# Patient Record
Sex: MALE | Race: WHITE | URBAN - METROPOLITAN AREA
[De-identification: names, ages, dates, MRNs, and addresses within clinical notes are randomized per-mention and may not be internally consistent; named-entity substitution may affect disease eponyms.]

---

## 2017-10-10 ENCOUNTER — HISTORICAL (OUTPATIENT)
Dept: ADMINISTRATIVE | Facility: HOSPITAL | Age: 67
End: 2017-10-10

## 2017-11-09 ENCOUNTER — HISTORICAL (OUTPATIENT)
Dept: ADMINISTRATIVE | Facility: HOSPITAL | Age: 67
End: 2017-11-09

## 2020-01-13 ENCOUNTER — HISTORICAL (OUTPATIENT)
Dept: ADMINISTRATIVE | Facility: HOSPITAL | Age: 70
End: 2020-01-13

## 2020-03-02 ENCOUNTER — HISTORICAL (OUTPATIENT)
Dept: ADMINISTRATIVE | Facility: HOSPITAL | Age: 70
End: 2020-03-02

## 2022-04-30 NOTE — OP NOTE
Patient:   Burt Krueger            MRN: [id number]            FIN: [id number]               Age:   66 years     Sex:  Male     :  1950   Associated Diagnoses:   None   Author:   Jaylen Prieto MD       Phacoemulsification of Cataract with Intraocular Implant   Preoperative Diagnosis: Cataract right eye   Postoperative Diagnosis : Cataract right eye  Surgeon: Jaylen Prieto MD  Assistant: MALENA Vilchis  Anestheisa: Topical  Complications: None  After the patient underwent topical anesthesia along with IV sedation in the holding area, the patient was brought to the operating suite. The patient was prepped and draped in a sterile fashion. A pediatric tegaderm and a lid speculum were used to retract the upper and lower lashes and lids.  A 1.0mm paracentesis was then made at the 11 oclock position. Intraocular non-preserved 1% Xylocaine (4% diluted down to 1%) was irrigated into the anterior chamber. Trypan blue dye was used to stain the anterior capsule then Endocoat was injected into the eye. A clear corneal incision was made with a 2.4 Keratome blade. BSS was used to hydro dissect the nucleus from the capsule. The nucleus was then phacoemulsified with the Abbott machine for a EFX of 39. The cortex was then removed with the I/A hand piece and Helon was placed into the posterior bag of the eye. An posterior chamber implant ZCB00 of power 19.5 was placed in the capsular bag. The Helon was then removed from the eye with the I/A hand piece . The anterior chamber was inflated with BSS and the wound was checked for leaks. The lid speculum was removed and a drop of Besivance was placed in the operative eye. The patient was brought to the recovery suite in stable condition.         10/10/2017 @ [facility]

## 2022-04-30 NOTE — OP NOTE
DATE OF SURGERY:        SURGEON:  Robson Mahoney MD    PREOPERATIVE DIAGNOSIS:  Epiretinal membrane with macular pucker to the right eye.    POSTOPERATIVE DIAGNOSIS:  Epiretinal membrane with macular pucker to the right eye.    PROCEDURES:  Pars plana vitrectomy with epiretinal membrane stripping and fluid-air exchange to the right eye.    ANESTHESIA:  General.    ESTIMATED BLOOD LOSS:  Less than 5 cc.    COMPLICATIONS:  None.    PROCEDURE INDICATIONS:  Mr. Krueger has a history of an epiretinal membrane causing macular pucker to the right eye.    DESCRIPTION OF PROCEDURE:  Patient was taken to the operative theater, where general anesthesia was begun.  The right eye was prepped and draped in the normal sterile fashion and a lid speculum applied.  A standard 3-port, 25-gauge pars plana vitrectomy was performed, with all trocars being placed 3.5 mm from the surgical limbus.  A core vitrectomy was performed.  The posterior hyaloid was moved out to the peripheral retina.  1 mg of Kenalog was injected into the vitreous cavity to highlight the epiretinal membrane, which was peeled from the macular surface using an ILM forceps without complication.  The peripheral retina was examined with scleral depression, and no retinal breaks were identified.  A fluid-air exchange was then performed.  All instruments were removed from the eye, and all sclerotomies massaged closed with cotton-tip swabs.  Several drops of TobraDex ophthalmic solution were applied to the eye.  The postoperative intraocular pressure was 15 mmHg.  The lid speculum and eye drape were then removed, and the eye was covered with a gauze patch and a Mcclendon shield.  The patient was then transported to the postoperative care unit to recover.    DISCHARGE CONDITION:  Good.      DISPOSITION:  Home, with followup with Dr. Mahoney the following day.  This patient tolerated the procedure well.        ______________________________  Robson Mahoney MD    RIB/UB  DD:   01/13/2020  Time:  01:11PM  DT:  01/13/2020  Time:  01:35PM  Job #:  [id number]

## 2022-04-30 NOTE — OP NOTE
Patient:   Burt Krueger            MRN: [id number]            FIN: [id number]               Age:   66 years     Sex:  Male     :  1950   Associated Diagnoses:   None   Author:   Jaylen Prieto MD       Phacoemulsification of Cataract with Intraocular Implant   Preoperative Diagnosis: Cataract left eye   Postoperative Diagnosis : Cataract left eye  Surgeon: Jaylen Prieto MD  Assistant: MALENA Vilchis  Anestheisa: Topical  Complications: None    After the patient underwent topical anesthesia along with IV sedation in the holding area, the patient was brought to the [facility] laser.  A time out was performed.  The capsulotomy, lens fragmentation and LRI's at 117 & 297 degrees with a length of 23 degrees were made.  Then the patient was brought to the operating suite. The patient prepped and draped in a sterile fashion. A pediatric tegaderm and a lid speculum were used to retract the upper and lower lashes and lids.  A 1.0mm paracentesis was then made at the 5 oclock and 11 oclock position. Intraocular non-preserved 1% Xylocaine (4% diluted down to 1%) was irrigated into the anterior chamber. Endocoat was injected into the eye. A clear corneal incision was made with a 2.4 Keratome blade. BSS was used to hydro dissect the nucleus from the capsule. The nucleus was then phacoemulsified with the Abbott machine for a EFX of 84. The cortex was then removed with the I/A hand piece and Helon was placed into the posterior bag of the eye. An posterior chamber implant ZCB00 of power 21.0 was placed in the capsular bag. The Helon was then removed from the eye with the I/A hand piece . The anterior chamber was inflated with BSS and the wound was checked for leaks. The lid speculum was removed and a drop of Besivance was placed in the operative eye. The patient was brought to the recovery suite in stable condition.         2017 @ [facility]

## 2022-04-30 NOTE — OP NOTE
DATE OF SURGERY:        SURGEON:  Robson Mahoney MD    PREOPERATIVE DIAGNOSIS:  Macula-off retinal detachment of the left eye with superotemporal horseshoe tear and inferotemporal proliferative vitreoretinopathy.    POSTOPERATIVE DIAGNOSIS:  Macula-off retinal detachment of the left eye with superotemporal horseshoe tear and inferotemporal proliferative vitreoretinopathy.    PROCEDURE:  Complex retinal detachment repair with pars plana vitrectomy, scleral buckling, membrane peeling, retinotomy, fluid air exchange, endolaser, and air gas exchange with 16% C3F8 gas, all to the left eye.    ANESTHESIA:  General.    ESTIMATED BLOOD LOSS:  Less than 5 cc.    COMPLICATIONS:  None.    PROCEDURE INDICATIONS:  Mr. Krueger has a history of a macula-off retinal detachment of the left eye with inferotemporal PVR.    PROCEDURE DESCRIPTION:  Patient was taken to the operative theater where general anesthesia was begun.  The left eye was prepped and draped in the normal sterile fashion and a lid speculum was applied.  A 360-degree conjunctival peritomy was created with all 4 rectus muscles isolated with 2-0 silk ties.  Four partial-thickness scleral belt loops were created in each quadrant of the globe, and the globe was encircled with a #42 silicone band, which was connected superonasally with a #270 silicone sleeve, and tightened to provide a moderate amount of scleral indentation.  This was followed by a standard 3-port 25-gauge pars plana vitrectomy.  All trocars were placed 3.5 mm from the surgical limbus.  A core vitrectomy was performed, including removal of the posterior hyaloid out to the peripheral retina and the vitreous base was shaved in 360 degrees.  Inferotemporal PVR membranes in the form of 2 fixed star folds were peeled using ILM forceps without complication.  The superotemporal horseshoe tear was identified and marked with endo cautery.  An inferonasal retinotomy was created and all subretinal fluid was  aspirated through the retinotomy during a fluid air exchange.  Endolaser was used to treat the inferotemporal retinotomy and the superotemporal horseshoe tear.  An air gas exchange with 16% C3F8 gas was then performed.  All instruments were removed from the eye and all sclerotomies were closed with 7-0 Vicryl.  The excess silicone was removed from the scleral buckle.  The 2-0 silk ties were removed.  The conjunctiva was reapproximated to the limbus and closed with 6-0 fast absorbing gut.  Subconjunctival injections of 0.5 cc of Ancef and 0.5 cc of Decadron were then placed followed by several drops of TobraDex ophthalmic solution.  The postoperative intraocular pressure was 15 mmHg.  The lid speculum and eye drape were then removed and the eye was covered with a gauze patch and a Mcclendon shield.  The patient was then transported to the postoperative care unit to recover.    DISCHARGE CONDITION:  Good.    DISPOSITION:  Home with followup with Dr. Mahoney the following day.       This patient tolerated the procedure well.        ______________________________  Robson Mahoney MD    RIB/SV  DD:  04/08/2020  Time:  10:40AM  DT:  04/08/2020  Time:  10:53AM  Job #:  [id number]